# Patient Record
Sex: FEMALE | Race: WHITE | NOT HISPANIC OR LATINO | Employment: STUDENT | URBAN - METROPOLITAN AREA
[De-identification: names, ages, dates, MRNs, and addresses within clinical notes are randomized per-mention and may not be internally consistent; named-entity substitution may affect disease eponyms.]

---
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
## 2023-08-28 ENCOUNTER — OFFICE VISIT (OUTPATIENT)
Dept: URGENT CARE | Facility: MEDICAL CENTER | Age: 18
End: 2023-08-28
Payer: COMMERCIAL

## 2023-08-28 VITALS
SYSTOLIC BLOOD PRESSURE: 118 MMHG | TEMPERATURE: 98.5 F | DIASTOLIC BLOOD PRESSURE: 64 MMHG | RESPIRATION RATE: 18 BRPM | HEART RATE: 86 BPM | OXYGEN SATURATION: 100 %
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
## 2023-08-28 DIAGNOSIS — S29.012A REPETITIVE STRAIN INJURY OF THORACIC SPINE, INITIAL ENCOUNTER: Primary | ICD-10-CM

## 2023-08-28 PROCEDURE — G0382 LEV 3 HOSP TYPE B ED VISIT: HCPCS | Performed by: FAMILY MEDICINE

## 2023-08-28 RX ORDER — METHOCARBAMOL 500 MG/1
500 TABLET, FILM COATED ORAL
Qty: 20 TABLET | Refills: 0 | Status: SHIPPED | OUTPATIENT
Start: 2023-08-28 | End: 2023-09-07
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
## 2023-08-28 NOTE — PATIENT INSTRUCTIONS
I prescribed Robaxin 500 mg at bedtime as needed. Patient recommended to apply heating pad to affected area 15 to 20 minutes as often as needed. She was also educated on upper back exercises. However, I also gave her outpatient physical therapy referral.    Upper Back Exercises   AMBULATORY CARE:   Upper back exercises  help heal and strengthen your back muscles and prevent another injury. Ask your healthcare provider if you need to see a physical therapist for more advanced exercises. Seek care immediately if:   You have severe pain that prevents you from moving. Call your doctor if:   Your pain becomes worse. You have new pain. You have questions or concerns about your condition, care, or exercise program.    What you need to know about exercise safety:   Do the exercises on a mat or firm surface (not on a bed). A firm surface will support your spine and prevent upper back pain. Move slowly and smoothly. Avoid fast or jerky motions. Breathe normally. Do not hold your breath. Stop if you feel pain. It is normal to feel some discomfort at first, but you should not feel pain. Regular exercise will help decrease your discomfort over time. Perform upper back exercises safely:  Ask your healthcare provider which of the following exercises are best for you and how often to do them. Head rolls:  Sit in a chair or stand. Bring your chin toward your chest and roll your head to the right. Your ear should be over your shoulder. Hold this position for 5 seconds. Roll your head back toward your chest and to the left. Your ear should be over your left shoulder. Hold this position for 5 seconds. Next, roll your head back slowly in a clockwise [place] and repeat 3 times. Do 3 sets of head rolls. Scapular squeeze:  Sit or stand with your arms at your sides. Squeeze your shoulder blades together and hold for 3 seconds. Relax and repeat 3 times. Pectoralis stretch:   a doorway. Lift your hands and place them on each side of the door frame or wall slightly higher than your head. Lean forward slowly until you feel a gentle stretch. Hold for 15 seconds. Repeat 3 times, or as directed. Cat and camel exercise:  Place your hands and knees on the floor. Arch your back upward toward the ceiling and lower your head. Round out your spine as much as you can. Hold for 5 seconds. Lift your head upward and push your chest downward toward the floor. Hold for 5 seconds. Do 3 sets or as directed. Bird dog:  Place your hands and knees on the floor. Keep your wrists directly below your shoulders and your knees directly below your hips. Pull your belly button in toward your spine. Do not flatten or arch your back. Tighten your abdominal muscles. Raise one arm straight out so that it is aligned with your head. Next, raise the leg opposite your arm. Hold this position for 15 seconds. Lower your arm and leg slowly and change sides. Do 5 sets. Follow up with your doctor as directed:  Write down your questions so you remember to ask them during your visits. © Copyright [employer] 2022 Information is for End User's use only and may not be sold, redistributed or otherwise used for commercial purposes. The above information is an  only. It is not intended as medical advice for individual conditions or treatments. Talk to your doctor, nurse or pharmacist before following any medical regimen to see if it is safe and effective for you.

## 2023-09-17 ENCOUNTER — OFFICE VISIT (OUTPATIENT)
Dept: URGENT CARE | Facility: MEDICAL CENTER | Age: 18
End: 2023-09-17
Payer: COMMERCIAL

## 2023-09-17 VITALS
WEIGHT: 135 LBS | OXYGEN SATURATION: 97 % | RESPIRATION RATE: 20 BRPM | TEMPERATURE: 98 F | HEART RATE: 91 BPM | HEIGHT: 64 IN | BODY MASS INDEX: 23.05 KG/M2

## 2023-09-17 DIAGNOSIS — Z20.822 ENCOUNTER FOR LABORATORY TESTING FOR COVID-19 VIRUS: ICD-10-CM

## 2023-09-17 DIAGNOSIS — J40 BRONCHITIS: ICD-10-CM

## 2023-09-17 DIAGNOSIS — J01.00 ACUTE MAXILLARY SINUSITIS, RECURRENCE NOT SPECIFIED: Primary | ICD-10-CM

## 2023-09-17 LAB
SARS-COV-2 AG UPPER RESP QL IA: NEGATIVE
VALID CONTROL: NORMAL

## 2023-09-17 PROCEDURE — 87811 SARS-COV-2 COVID19 W/OPTIC: CPT | Performed by: PHYSICIAN ASSISTANT

## 2023-09-17 PROCEDURE — 99213 OFFICE O/P EST LOW 20 MIN: CPT | Performed by: PHYSICIAN ASSISTANT

## 2023-09-17 RX ORDER — PREGABALIN 25 MG/1
CAPSULE ORAL
COMMUNITY
Start: 2023-09-11

## 2023-09-17 RX ORDER — VENLAFAXINE HYDROCHLORIDE 150 MG/1
150 CAPSULE, EXTENDED RELEASE ORAL DAILY
COMMUNITY

## 2023-09-17 RX ORDER — ALBUTEROL SULFATE 90 UG/1
2 AEROSOL, METERED RESPIRATORY (INHALATION) EVERY 6 HOURS PRN
Qty: 8.5 G | Refills: 0 | Status: SHIPPED | OUTPATIENT
Start: 2023-09-17

## 2023-09-17 RX ORDER — AZITHROMYCIN 250 MG/1
TABLET, FILM COATED ORAL
Qty: 6 TABLET | Refills: 0 | Status: SHIPPED | OUTPATIENT
Start: 2023-09-17 | End: 2023-09-21

## 2023-09-17 RX ORDER — BENZONATATE 100 MG/1
100 CAPSULE ORAL 3 TIMES DAILY PRN
Qty: 20 CAPSULE | Refills: 0 | Status: SHIPPED | OUTPATIENT
Start: 2023-09-17

## 2023-09-17 RX ORDER — PREDNISONE 10 MG/1
TABLET ORAL
Qty: 20 TABLET | Refills: 0 | Status: SHIPPED | OUTPATIENT
Start: 2023-09-17

## 2023-09-17 NOTE — PROGRESS NOTES
[facility]        NAME: Yannick Sherman is a 21 y.o. female  : 2005    MRN: [id number]  DATE: 2023  TIME: 11:41 AM    Pulse 91   Temp 98 °F (36.7 °C)   Resp 20   Ht 5' 4" (1.626 m)   Wt 61.2 kg (135 lb)   LMP  (LMP Unknown) Comment: Depo  SpO2 97%   BMI 23.17 kg/m²     Assessment and Plan   Acute maxillary sinusitis, recurrence not specified [J01.00]  1. Acute maxillary sinusitis, recurrence not specified  Poct Covid 19 Rapid Antigen Test    azithromycin (ZITHROMAX) 250 mg tablet    albuterol (ProAir HFA) 90 mcg/act inhaler    benzonatate (TESSALON PERLES) 100 mg capsule    predniSONE 10 mg tablet      2. Bronchitis  azithromycin (ZITHROMAX) 250 mg tablet    albuterol (ProAir HFA) 90 mcg/act inhaler    benzonatate (TESSALON PERLES) 100 mg capsule    predniSONE 10 mg tablet      3. Encounter for laboratory testing for COVID-19 virus  azithromycin (ZITHROMAX) 250 mg tablet    albuterol (ProAir HFA) 90 mcg/act inhaler    benzonatate (TESSALON PERLES) 100 mg capsule    predniSONE 10 mg tablet            Patient Instructions       Follow up with PCP in 3-5 days. Proceed to  ER if symptoms worsen. Chief Complaint     Chief Complaint   Patient presents with   • Cough     Wednesday she woke with not feeling well. She had a negative covid test.  She has cough, stuffy nose, +PND, mucous is green. She states her heart rate is up and she gets winded easily         History of Present Illness       Pt with 4-5 days productive cough yellow nasal congestion and minor chest tightness       Review of Systems   Review of Systems   Constitutional: Negative. HENT: Negative. Eyes: Negative. Respiratory: Negative. Cardiovascular: Negative. Gastrointestinal: Negative. Endocrine: Negative. Genitourinary: Negative. Musculoskeletal: Negative. Skin: Negative. Allergic/Immunologic: Negative. Neurological: Negative. Hematological: Negative. Psychiatric/Behavioral: Negative. All other systems reviewed and are negative.         Current Medications       Current Outpatient Medications:   •  albuterol (ProAir HFA) 90 mcg/act inhaler, Inhale 2 puffs every 6 (six) hours as needed for wheezing, Disp: 8.5 g, Rfl: 0  •  albuterol (PROVENTIL HFA,VENTOLIN HFA) 90 mcg/act inhaler, Inhale 2 puffs, Disp: , Rfl:   •  azithromycin (ZITHROMAX) 250 mg tablet, Take 2 tablets today then 1 tablet daily x 4 days, Disp: 6 tablet, Rfl: 0  •  benzonatate (TESSALON PERLES) 100 mg capsule, Take 1 capsule (100 mg total) by mouth 3 (three) times a day as needed for cough, Disp: 20 capsule, Rfl: 0  •  cloNIDine (CATAPRES) 0.1 mg tablet, TAKE 1/2 TABLET IN THE MORNING AND ONE FULL TABLET AT NIGHT, Disp: , Rfl:   •  EPINEPHrine (EPIPEN) 0.3 mg/0.3 mL SOAJ, as directed, Disp: , Rfl:   •  fexofenadine (ALLEGRA) 180 MG tablet, Take 180 mg by mouth daily, Disp: , Rfl:   •  fluticasone (FLONASE) 50 mcg/act nasal spray, , Disp: , Rfl:   •  Mefenamic Acid 250 MG CAPS, Take 2 capsules by mouth once, then take 1 capsule by mouth every eight hours as needed for menstrual cramps, Disp: , Rfl:   •  melatonin 1 mg, Take by mouth, Disp: , Rfl:   •  predniSONE 10 mg tablet, 4 tabs po qd x 2 days then 3 tabs po qd x 2 days then 2 tabs po qd x 2 days then 1 tab po qd x 2 days, Disp: 20 tablet, Rfl: 0  •  senna (SENOKOT) 8.6 mg, Take 8.6 mg by mouth 2 (two) times a day, Disp: , Rfl:   •  tretinoin (RETIN-A) 0.01 % gel, , Disp: , Rfl:   •  venlafaxine (EFFEXOR-XR) 150 mg 24 hr capsule, Take 150 mg by mouth daily, Disp: , Rfl:   •  venlafaxine (EFFEXOR-XR) 75 mg 24 hr capsule, Take 75 mg by mouth daily, Disp: , Rfl:   •  buPROPion (WELLBUTRIN XL) 150 mg 24 hr tablet, , Disp: , Rfl:   •  drospirenone-ethinyl estradiol (DONNY) 3-0.03 MG per tablet, Take 1 tablet by mouth daily (Patient not taking: Reported on 9/17/2023), Disp: , Rfl:   •  lactase (LACTAID) 3,000 units tablet, Take 3,000 Units by mouth, Disp: , Rfl:   •  methocarbamol (ROBAXIN) 500 mg tablet, Take 1 tablet (500 mg total) by mouth daily at bedtime as needed for muscle spasms for up to 10 days, Disp: 20 tablet, Rfl: 0  •  norgestimate-ethinyl estradiol (ORTHO-CYCLEN) 0.25-35 MG-MCG per tablet, Take 1 tablet by mouth daily, Disp: , Rfl:   •  norgestrel-ethinyl estradiol (LO/OVRAL) 0.3 mg-30 mcg per tablet, Take 1 tablet by mouth daily (Patient not taking: Reported on 9/17/2023), Disp: , Rfl:   •  pantoprazole (PROTONIX) 40 mg tablet, , Disp: , Rfl:   •  polyethylene glycol (GLYCOLAX) 17 GM/SCOOP powder, , Disp: , Rfl:   •  pregabalin (LYRICA) 25 mg capsule, TAKE 1 CAPSULE BY MOUTH EVERY DAY NIGHTLY, Disp: , Rfl:   •  propranolol (INDERAL) 10 mg tablet, , Disp: , Rfl:   •  venlafaxine (EFFEXOR-XR) 37.5 mg 24 hr capsule, Take 37.5 mg by mouth daily, Disp: , Rfl:     Current Allergies     Allergies as of 09/17/2023 - Reviewed 09/17/2023   Allergen Reaction Noted   • Adapalene-benzoyl peroxide Other (See Comments) and Rash 08/12/2016            The following portions of the patient's history were reviewed and updated as appropriate: allergies, current medications, past family history, past medical history, past social history, past surgical history and problem list.     Past Medical History:   Diagnosis Date   • Constipation    • Depression    • IBS (irritable bowel syndrome)    • PCOS (polycystic ovarian syndrome)    • Seasonal allergies        History reviewed. No pertinent surgical history. History reviewed. No pertinent family history. Medications have been verified. Objective   Pulse 91   Temp 98 °F (36.7 °C)   Resp 20   Ht 5' 4" (1.626 m)   Wt 61.2 kg (135 lb)   LMP  (LMP Unknown) Comment: Depo  SpO2 97%   BMI 23.17 kg/m²        Physical Exam     Physical Exam  Vitals and nursing note reviewed. Constitutional:       Appearance: Normal appearance. She is normal weight. HENT:      Head: Normocephalic and atraumatic. Right Ear: Tympanic membrane, ear canal and external ear normal.      Left Ear: Tympanic membrane, ear canal and external ear normal.      Nose: Congestion and rhinorrhea present. Comments: Boggy mucosa  Yellow nasal d/c      Mouth/Throat:      Mouth: Mucous membranes are moist.      Pharynx: Oropharynx is clear. Eyes:      Extraocular Movements: Extraocular movements intact. Conjunctiva/sclera: Conjunctivae normal.      Pupils: Pupils are equal, round, and reactive to light. Cardiovascular:      Rate and Rhythm: Normal rate and regular rhythm. Pulses: Normal pulses. Heart sounds: Normal heart sounds. Pulmonary:      Effort: Pulmonary effort is normal.      Comments: Minor coarse sounds   Abdominal:      General: Abdomen is flat. Bowel sounds are normal.      Palpations: Abdomen is soft. Musculoskeletal:         General: Normal range of motion. Cervical back: Normal range of motion and neck supple. Skin:     General: Skin is warm. Capillary Refill: Capillary refill takes less than 2 seconds. Neurological:      General: No focal deficit present. Mental Status: She is alert and oriented to person, place, and time.    Psychiatric:         Mood and Affect: Mood normal.

## 2024-01-20 ENCOUNTER — OFFICE VISIT (OUTPATIENT)
Dept: URGENT CARE | Facility: MEDICAL CENTER | Age: 19
End: 2024-01-20
Payer: COMMERCIAL

## 2024-01-20 VITALS
DIASTOLIC BLOOD PRESSURE: 64 MMHG | SYSTOLIC BLOOD PRESSURE: 99 MMHG | HEART RATE: 94 BPM | RESPIRATION RATE: 18 BRPM | OXYGEN SATURATION: 99 % | TEMPERATURE: 98.3 F

## 2024-01-20 DIAGNOSIS — U07.1 COVID: ICD-10-CM

## 2024-01-20 DIAGNOSIS — J02.9 PHARYNGITIS, UNSPECIFIED ETIOLOGY: Primary | ICD-10-CM

## 2024-01-20 LAB — S PYO AG THROAT QL: NEGATIVE

## 2024-01-20 PROCEDURE — 99213 OFFICE O/P EST LOW 20 MIN: CPT | Performed by: PHYSICIAN ASSISTANT

## 2024-01-20 PROCEDURE — 87880 STREP A ASSAY W/OPTIC: CPT | Performed by: PHYSICIAN ASSISTANT

## 2024-01-20 RX ORDER — METRONIDAZOLE 10 MG/G
1 GEL TOPICAL DAILY
COMMUNITY
Start: 2023-06-12 | End: 2024-06-11

## 2024-01-20 RX ORDER — KETOROLAC TROMETHAMINE 10 MG/1
10 TABLET, FILM COATED ORAL 2 TIMES DAILY
COMMUNITY
Start: 2023-10-10

## 2024-01-20 RX ORDER — PREDNISONE 20 MG/1
TABLET ORAL
Qty: 10 TABLET | Refills: 0 | Status: SHIPPED | OUTPATIENT
Start: 2024-01-20

## 2024-01-20 RX ORDER — MEDROXYPROGESTERONE ACETATE 150 MG/ML
150 INJECTION, SUSPENSION INTRAMUSCULAR
COMMUNITY

## 2024-01-20 RX ORDER — PIMECROLIMUS 10 MG/G
1 CREAM TOPICAL 2 TIMES DAILY
COMMUNITY
Start: 2023-10-13

## 2024-01-20 RX ORDER — NIRMATRELVIR AND RITONAVIR 300-100 MG
KIT ORAL
COMMUNITY
Start: 2024-01-18

## 2024-01-20 RX ORDER — TACROLIMUS 1 MG/G
OINTMENT TOPICAL 2 TIMES DAILY
COMMUNITY

## 2024-01-20 RX ORDER — CYCLOBENZAPRINE HCL 10 MG
10 TABLET ORAL DAILY PRN
COMMUNITY

## 2024-01-20 RX ORDER — DESONIDE 0.5 MG/G
1 OINTMENT TOPICAL 2 TIMES DAILY
COMMUNITY
Start: 2023-08-30 | End: 2024-08-29

## 2024-01-20 NOTE — PROGRESS NOTES
[facility]    NAME: Gita Garcia is a 18 y.o. female  : 2005    MRN: [id number]  DATE: 2024  TIME: 11:30 AM    Assessment and Plan   Pharyngitis, unspecified etiology [J02.9]  1. Pharyngitis, unspecified etiology  POCT rapid strepA    predniSONE 20 mg tablet      2. COVID            Patient Instructions     Patient Instructions   Take prednisone as instructed.  While on prednisone do not take any ibuprofen or ibuprofen like products.  May safely take Tylenol if needed.     Warm salt water gargles, throat lozenges, Chloraseptic spray, Tylenol and/or ibuprofen (if not contraindicated) per bottle instructions for comfort as needed.  Warm tea with honey may also be soothing.    Follow-up with primary care if not resolving over the next 7 to 10 days.  May need more evaluation then can be done in the care in our office.    If you would develop severe worsening of throat pain, hot potato voice, inability to swallow saliva to the point of drooling due to throat swelling please proceed immediately to emergency room for further evaluation.       Chief Complaint     Chief Complaint   Patient presents with    Sore Throat     Patient reports that she Covid positive. She reports her sore throat has become worst and noted white spots.        History of Present Illness   Gita Garcia presents to the clinic c/o  18-year-old female comes in with terrible sore throat after starting with COVID a few days ago.  Just had first full day of Paxlovid yesterday.    Has had fever, chills, cough, congestion and worsening sore throat.  Has been doing ibuprofen 400 mg every 4-6 hours and Tylenol in between.  She says she feels worse with the Tylenol in between.  She is concerned that maybe she also has strep.  No known exposures to strep.    Sore Throat   Associated symptoms include congestion, coughing and trouble swallowing. Pertinent negatives include no ear discharge, ear pain or shortness of breath.        Review of Systems   Review of Systems   Constitutional:  Positive for activity change, appetite change, chills, diaphoresis, fatigue and fever.   HENT:  Positive for congestion, postnasal drip, rhinorrhea, sinus pressure, sore throat and trouble swallowing. Negative for ear discharge, ear pain and sinus pain.    Eyes: Negative.    Respiratory:  Positive for cough. Negative for chest tightness, shortness of breath and wheezing.    Cardiovascular: Negative.    Hematological: Negative.        Current Medications     Long-Term Medications   Medication Sig Dispense Refill    desonide (DESOWEN) 0.05 % ointment Apply 1 application. topically 2 (two) times a day      ketorolac (TORADOL) 10 mg tablet Take 10 mg by mouth 2 (two) times a day      metroNIDAZOLE (METROGEL) 1 % gel Apply 1 application. topically daily      pimecrolimus (ELIDEL) 1 % cream Apply 1 Application topically 2 (two) times a day To affected area      buPROPion (WELLBUTRIN XL) 150 mg 24 hr tablet  (Patient not taking: No sig reported)      Cholecalciferol 50 MCG (2000 UT) CAPS Take by mouth daily      cloNIDine (CATAPRES) 0.1 mg tablet TAKE 1/2 TABLET IN THE MORNING AND ONE FULL TABLET AT NIGHT      cyclobenzaprine (FLEXERIL) 10 mg tablet Take 10 mg by mouth daily as needed      drospirenone-ethinyl estradiol (DONNY) 3-0.03 MG per tablet Take 1 tablet by mouth daily (Patient not taking: Reported on 9/17/2023)      EPINEPHrine (EPIPEN) 0.3 mg/0.3 mL SOAJ as directed      fexofenadine (ALLEGRA) 180 MG tablet Take 180 mg by mouth daily      fluticasone (FLONASE) 50 mcg/act nasal spray       lactase (LACTAID) 3,000 units tablet Take 3,000 Units by mouth      linaCLOtide 145 MCG CAPS Take by mouth      medroxyPROGESTERone (DEPO-PROVERA) 150 mg/mL injection Inject 150 mg into a muscle every 3 (three) months      Mefenamic Acid 250 MG CAPS Take 2 capsules by mouth once, then take 1 capsule by mouth every eight hours as needed for menstrual cramps       methocarbamol (ROBAXIN) 500 mg tablet Take 1 tablet (500 mg total) by mouth daily at bedtime as needed for muscle spasms for up to 10 days 20 tablet 0    norgestimate-ethinyl estradiol (ORTHO-CYCLEN) 0.25-35 MG-MCG per tablet Take 1 tablet by mouth daily      norgestrel-ethinyl estradiol (LO/OVRAL) 0.3 mg-30 mcg per tablet Take 1 tablet by mouth daily (Patient not taking: Reported on 9/17/2023)      pantoprazole (PROTONIX) 40 mg tablet  (Patient not taking: Reported on 9/17/2023)      polyethylene glycol (GLYCOLAX) 17 GM/SCOOP powder  (Patient not taking: Reported on 9/17/2023)      pregabalin (LYRICA) 25 mg capsule TAKE 1 CAPSULE BY MOUTH EVERY DAY NIGHTLY      propranolol (INDERAL) 10 mg tablet  (Patient not taking: Reported on 9/17/2023)      senna (SENOKOT) 8.6 mg Take 8.6 mg by mouth 2 (two) times a day      tacrolimus (PROTOPIC) 0.1 % ointment Apply topically 2 (two) times a day To affected area      tretinoin (RETIN-A) 0.01 % gel       venlafaxine (EFFEXOR-XR) 150 mg 24 hr capsule Take 150 mg by mouth daily      venlafaxine (EFFEXOR-XR) 37.5 mg 24 hr capsule Take 37.5 mg by mouth daily      venlafaxine (EFFEXOR-XR) 75 mg 24 hr capsule Take 75 mg by mouth daily         Current Allergies     Allergies as of 01/20/2024 - Reviewed 01/20/2024   Allergen Reaction Noted    Egg phospholipids - food allergy Anaphylaxis, Hives, and Swelling 01/20/2024    Peanut (diagnostic) - food allergy Anaphylaxis, Hives, and Swelling 01/20/2024    Peanut oil - food allergy Anaphylaxis, Other (See Comments), and Rash 03/11/2015    Gluten meal - food allergy Diarrhea, GI Intolerance, Other (See Comments), and Vomiting 05/21/2019    Milk-related compounds - food allergy Diarrhea 05/21/2019    Wheat bran - food allergy Diarrhea and Other (See Comments) 07/02/2019    Adapalene-benzoyl peroxide Other (See Comments) and Rash 08/12/2016    Banana extract allergy skin test - food allergy GI Intolerance 08/09/2021          The following  portions of the patient's history were reviewed and updated as appropriate: allergies, current medications, past family history, past medical history, past social history, past surgical history and problem list.  Past Medical History:   Diagnosis Date    Constipation     Depression     IBS (irritable bowel syndrome)     PCOS (polycystic ovarian syndrome)     Seasonal allergies      History reviewed. No pertinent surgical history.  History reviewed. No pertinent family history.    Objective   BP 99/64   Pulse 94   Temp 98.3 °F (36.8 °C)   Resp 18   SpO2 99%   No LMP recorded.       Physical Exam     Physical Exam  Vitals and nursing note reviewed.   Constitutional:       General: She is not in acute distress.     Appearance: She is well-developed. She is ill-appearing. She is not toxic-appearing or diaphoretic.      Comments: Appears mildly ill but in no acute distress.  No trismus or conversational dyspnea.   HENT:      Head: Normocephalic and atraumatic.      Right Ear: Tympanic membrane, ear canal and external ear normal. No drainage, swelling or tenderness. No middle ear effusion. Tympanic membrane is not erythematous.      Left Ear: Tympanic membrane, ear canal and external ear normal. No drainage, swelling or tenderness.  No middle ear effusion. Tympanic membrane is not erythematous.      Nose: Congestion present. No rhinorrhea.      Mouth/Throat:      Mouth: Mucous membranes are moist. No oral lesions.      Pharynx: Uvula midline. Pharyngeal swelling and posterior oropharyngeal erythema present. No oropharyngeal exudate or uvula swelling.      Tonsils: No tonsillar exudate or tonsillar abscesses.      Comments: Generalized tonsillar pharyngeal redness without exudate or obvious lesions.  Patent airway.  No trismus.  Eyes:      General:         Right eye: No discharge.         Left eye: No discharge.      Conjunctiva/sclera: Conjunctivae normal.      Pupils: Pupils are equal, round, and reactive to light.    Cardiovascular:      Rate and Rhythm: Normal rate and regular rhythm.      Heart sounds: Normal heart sounds. No murmur heard.     No friction rub. No gallop.   Pulmonary:      Effort: Pulmonary effort is normal. No respiratory distress.      Breath sounds: Normal breath sounds. No stridor. No wheezing, rhonchi or rales.   Musculoskeletal:      Cervical back: Normal range of motion and neck supple. No rigidity or tenderness.   Lymphadenopathy:      Cervical: No cervical adenopathy.   Skin:     General: Skin is warm and dry.      Coloration: Skin is not jaundiced or pale.      Findings: No rash.   Neurological:      Mental Status: She is alert and oriented to person, place, and time.   Psychiatric:         Mood and Affect: Mood normal.         Behavior: Behavior normal.

## 2024-01-20 NOTE — PATIENT INSTRUCTIONS
Take prednisone as instructed.  While on prednisone do not take any ibuprofen or ibuprofen like products.  May safely take Tylenol if needed.     Warm salt water gargles, throat lozenges, Chloraseptic spray, Tylenol and/or ibuprofen (if not contraindicated) per bottle instructions for comfort as needed.  Warm tea with honey may also be soothing.    Follow-up with primary care if not resolving over the next 7 to 10 days.  May need more evaluation then can be done in the care in our office.    If you would develop severe worsening of throat pain, hot potato voice, inability to swallow saliva to the point of drooling due to throat swelling please proceed immediately to emergency room for further evaluation.

## 2024-02-25 ENCOUNTER — OFFICE VISIT (OUTPATIENT)
Dept: URGENT CARE | Facility: MEDICAL CENTER | Age: 19
End: 2024-02-25
Payer: COMMERCIAL

## 2024-02-25 VITALS
DIASTOLIC BLOOD PRESSURE: 71 MMHG | RESPIRATION RATE: 18 BRPM | OXYGEN SATURATION: 100 % | HEART RATE: 120 BPM | SYSTOLIC BLOOD PRESSURE: 127 MMHG | TEMPERATURE: 99.4 F

## 2024-02-25 DIAGNOSIS — J02.9 SORE THROAT: ICD-10-CM

## 2024-02-25 DIAGNOSIS — J02.9 ACUTE PHARYNGITIS, UNSPECIFIED ETIOLOGY: Primary | ICD-10-CM

## 2024-02-25 LAB — S PYO AG THROAT QL: NEGATIVE

## 2024-02-25 PROCEDURE — 87070 CULTURE OTHR SPECIMN AEROBIC: CPT | Performed by: FAMILY MEDICINE

## 2024-02-25 PROCEDURE — 99213 OFFICE O/P EST LOW 20 MIN: CPT | Performed by: FAMILY MEDICINE

## 2024-02-25 PROCEDURE — 87880 STREP A ASSAY W/OPTIC: CPT | Performed by: FAMILY MEDICINE

## 2024-02-25 NOTE — PATIENT INSTRUCTIONS
Cold Symptoms   AMBULATORY CARE:   Cold symptoms  include sneezing, dry throat, a stuffy nose, headache, watery eyes, and a cough. Your cough may be dry, or you may cough up mucus. You may also have muscle aches, joint pain, and tiredness. Rarely, you may have a fever. Cold symptoms occur from inflammation in your upper respiratory system caused by a virus. Most colds go away without treatment.  Seek care immediately if:   You have increased tiredness and weakness.    You are unable to eat.     Your heart is beating much faster than usual for you.     You see white spots in the back of your throat and your neck is swollen and sore to the touch.     You see pinpoint or larger reddish-purple dots on your skin.    Contact your healthcare provider if:   You have a fever higher than 102°F (38.9°C).    You have new or worsening shortness of breath.     You have thick nasal drainage for more than 2 days.     Your symptoms do not improve or get worse within 5 days.     You have questions or concerns about your condition or care.    Treatment for cold symptoms  may include NSAIDS to decrease muscle aches and fever. Cold medicines may also be given to decrease coughing, nasal stuffiness, sneezing, and a runny nose.   Manage your cold symptoms:  The following may help relieve cold symptoms, such as a dry throat and congestion:  Gargle with mouthwash or warm salt water as directed.     Suck on throat lozenges or hard candy.     Use a cold or warm vaporizer or humidifier to ease your breathing.     Rest for at least 2 days and then as needed to decrease tiredness and weakness.     Use petroleum based jelly around your nostrils to decrease irritation from blowing your nose.     Drink plenty of liquids. Liquids will help thin and loosen thick mucus so you can cough it up. Liquids will also keep you hydrated. Ask your healthcare provider which liquids are best for you and how much to drink each day.    Prevent the spread of germs   by washing your hands often. You can spread your cold germs to others for at least 3 days after your symptoms start. Do not share items, such as eating utensils. Cover your nose and mouth when you cough or sneeze using the crook of your elbow instead of your hands. Throw used tissues in the garbage.  Do not smoke:  Smoking may worsen your symptoms and increase the length of time you feel sick. Talk with your healthcare provider if you need help to stop smoking.  Follow up with your doctor as directed:  Write down your questions so you remember to ask them during your visits.  © Copyright [employer] 2023 Information is for End User's use only and may not be sold, redistributed or otherwise used for commercial purposes.  The above information is an  only. It is not intended as medical advice for individual conditions or treatments. Talk to your doctor, nurse or pharmacist before following any medical regimen to see if it is safe and effective for you.

## 2024-02-25 NOTE — PROGRESS NOTES
[facility]        NAME: Gita Garcia is a 18 y.o. female  : 2005    MRN: [id number]  DATE: 2024  TIME: 5:11 PM    Assessment and Plan   Acute pharyngitis, unspecified etiology [J02.9]  1. Acute pharyngitis, unspecified etiology  Throat culture      2. Sore throat  POCT rapid ANTIGEN strepA        POCT Strep: negative  Throat culture pending  Will treat conservatively for now    Patient Instructions       Follow up with PCP in 3-5 days.  Proceed to  ER if symptoms worsen.    Chief Complaint     Chief Complaint   Patient presents with   • Sore Throat     Patient c/o sore throat and fever x 2 days          History of Present Illness       Sore Throat   This is a new problem. The current episode started yesterday. The problem has been gradually worsening. Neither side of throat is experiencing more pain than the other. There has been no fever. The pain is at a severity of 8/10. Associated symptoms include coughing. Pertinent negatives include no abdominal pain, congestion, diarrhea, drooling, ear discharge, ear pain, headaches, hoarse voice, plugged ear sensation, neck pain, shortness of breath, stridor, swollen glands, trouble swallowing or vomiting. She has had no exposure to strep or mono.     Gita Garcia is a 18 y.o. female  who presented to [facility] today  with a h/o sore throat that started yesterday/last night.  She feels like her throat is swollen, and painful to swallow.  She has fever, and took some Ibuprofen earlier, but is wearing off and can feel like she has a fever again.    + chills, body aches  No nausea, vomiting, diarrhea      Answers submitted by the patient for this visit:  Sore Throat Questionnaire (Submitted on 2024)  Chief Complaint: Sore throat  Chronicity: new  Onset: yesterday  Progression since onset: gradually worsening  Pain worse on: neither  Fever: no fever  Pain - numeric: 8/10      Review of Systems   Review of Systems   HENT:   Positive for sore throat. Negative for congestion, drooling, ear discharge, ear pain, hoarse voice and trouble swallowing.    Respiratory:  Positive for cough. Negative for shortness of breath and stridor.    Gastrointestinal:  Negative for abdominal pain, diarrhea and vomiting.   Musculoskeletal:  Negative for neck pain.   Neurological:  Negative for headaches.         Current Medications       Current Outpatient Medications:   •  albuterol (ProAir HFA) 90 mcg/act inhaler, Inhale 2 puffs every 6 (six) hours as needed for wheezing, Disp: 8.5 g, Rfl: 0  •  albuterol (PROVENTIL HFA,VENTOLIN HFA) 90 mcg/act inhaler, Inhale 2 puffs, Disp: , Rfl:   •  benzonatate (TESSALON PERLES) 100 mg capsule, Take 1 capsule (100 mg total) by mouth 3 (three) times a day as needed for cough, Disp: 20 capsule, Rfl: 0  •  buPROPion (WELLBUTRIN XL) 150 mg 24 hr tablet, , Disp: , Rfl:   •  Cholecalciferol 50 MCG (2000 UT) CAPS, Take by mouth daily, Disp: , Rfl:   •  cloNIDine (CATAPRES) 0.1 mg tablet, TAKE 1/2 TABLET IN THE MORNING AND ONE FULL TABLET AT NIGHT, Disp: , Rfl:   •  cyclobenzaprine (FLEXERIL) 10 mg tablet, Take 10 mg by mouth daily as needed, Disp: , Rfl:   •  desonide (DESOWEN) 0.05 % ointment, Apply 1 application. topically 2 (two) times a day, Disp: , Rfl:   •  drospirenone-ethinyl estradiol (DONNY) 3-0.03 MG per tablet, Take 1 tablet by mouth daily (Patient not taking: Reported on 9/17/2023), Disp: , Rfl:   •  EPINEPHrine (EPIPEN) 0.3 mg/0.3 mL SOAJ, as directed, Disp: , Rfl:   •  fexofenadine (ALLEGRA) 180 MG tablet, Take 180 mg by mouth daily, Disp: , Rfl:   •  fluticasone (FLONASE) 50 mcg/act nasal spray, , Disp: , Rfl:   •  ketorolac (TORADOL) 10 mg tablet, Take 10 mg by mouth 2 (two) times a day, Disp: , Rfl:   •  lactase (LACTAID) 3,000 units tablet, Take 3,000 Units by mouth, Disp: , Rfl:   •  linaCLOtide 145 MCG CAPS, Take by mouth, Disp: , Rfl:   •  medroxyPROGESTERone (DEPO-PROVERA) 150 mg/mL injection, Inject  150 mg into a muscle every 3 (three) months, Disp: , Rfl:   •  Mefenamic Acid 250 MG CAPS, Take 2 capsules by mouth once, then take 1 capsule by mouth every eight hours as needed for menstrual cramps, Disp: , Rfl:   •  melatonin 1 mg, Take by mouth, Disp: , Rfl:   •  methocarbamol (ROBAXIN) 500 mg tablet, Take 1 tablet (500 mg total) by mouth daily at bedtime as needed for muscle spasms for up to 10 days, Disp: 20 tablet, Rfl: 0  •  metroNIDAZOLE (METROGEL) 1 % gel, Apply 1 application. topically daily, Disp: , Rfl:   •  MONTELUKAST SODIUM PO, Take 5 mg by mouth daily, Disp: , Rfl:   •  norgestimate-ethinyl estradiol (ORTHO-CYCLEN) 0.25-35 MG-MCG per tablet, Take 1 tablet by mouth daily, Disp: , Rfl:   •  norgestrel-ethinyl estradiol (LO/OVRAL) 0.3 mg-30 mcg per tablet, Take 1 tablet by mouth daily (Patient not taking: Reported on 9/17/2023), Disp: , Rfl:   •  pantoprazole (PROTONIX) 40 mg tablet, , Disp: , Rfl:   •  Paxlovid, 300/100, tablet therapy pack, , Disp: , Rfl:   •  pimecrolimus (ELIDEL) 1 % cream, Apply 1 Application topically 2 (two) times a day To affected area, Disp: , Rfl:   •  polyethylene glycol (GLYCOLAX) 17 GM/SCOOP powder, , Disp: , Rfl:   •  predniSONE 10 mg tablet, 4 tabs po qd x 2 days then 3 tabs po qd x 2 days then 2 tabs po qd x 2 days then 1 tab po qd x 2 days, Disp: 20 tablet, Rfl: 0  •  predniSONE 20 mg tablet, One po bid x 5 days.  Take with food, Disp: 10 tablet, Rfl: 0  •  pregabalin (LYRICA) 25 mg capsule, TAKE 1 CAPSULE BY MOUTH EVERY DAY NIGHTLY, Disp: , Rfl:   •  propranolol (INDERAL) 10 mg tablet, , Disp: , Rfl:   •  senna (SENOKOT) 8.6 mg, Take 8.6 mg by mouth 2 (two) times a day, Disp: , Rfl:   •  tacrolimus (PROTOPIC) 0.1 % ointment, Apply topically 2 (two) times a day To affected area, Disp: , Rfl:   •  tretinoin (RETIN-A) 0.01 % gel, , Disp: , Rfl:   •  venlafaxine (EFFEXOR-XR) 150 mg 24 hr capsule, Take 150 mg by mouth daily, Disp: , Rfl:   •  venlafaxine (EFFEXOR-XR) 37.5  mg 24 hr capsule, Take 37.5 mg by mouth daily, Disp: , Rfl:   •  venlafaxine (EFFEXOR-XR) 75 mg 24 hr capsule, Take 75 mg by mouth daily, Disp: , Rfl:     Current Allergies     Allergies as of 02/25/2024 - Reviewed 01/20/2024   Allergen Reaction Noted   • Albumen, egg - food allergy Anaphylaxis 10/25/2019   • Egg phospholipids - food allergy Anaphylaxis, Hives, and Swelling 01/20/2024   • Peanut (diagnostic) - food allergy Anaphylaxis, Hives, and Swelling 01/20/2024   • Peanut oil - food allergy Anaphylaxis, Other (See Comments), and Rash 03/11/2015   • Gluten meal - food allergy Diarrhea, GI Intolerance, Other (See Comments), and Vomiting 05/21/2019   • Milk-related compounds - food allergy Diarrhea 05/21/2019   • Wheat bran - food allergy Diarrhea and Other (See Comments) 07/02/2019   • Adapalene-benzoyl peroxide Other (See Comments) and Rash 08/12/2016   • Banana extract allergy skin test - food allergy GI Intolerance 08/09/2021            The following portions of the patient's history were reviewed and updated as appropriate: allergies, current medications, past family history, past medical history, past social history, past surgical history and problem list.     Past Medical History:   Diagnosis Date   • Constipation    • Depression    • IBS (irritable bowel syndrome)    • PCOS (polycystic ovarian syndrome)    • Seasonal allergies        No past surgical history on file.    No family history on file.      Medications have been verified.        Objective   /71   Pulse (!) 120   Temp 99.4 °F (37.4 °C)   Resp 18   SpO2 100%   No LMP recorded.       Physical Exam     Physical Exam  Vitals and nursing note reviewed.   Constitutional:       Appearance: She is well-developed.   HENT:      Head: Normocephalic and atraumatic.      Right Ear: Tympanic membrane and ear canal normal.      Left Ear: Tympanic membrane and ear canal normal.      Nose: No congestion or rhinorrhea.      Mouth/Throat:      Pharynx:  Pharyngeal swelling and posterior oropharyngeal erythema present.      Tonsils: 0 on the right. 0 on the left.   Eyes:      Conjunctiva/sclera: Conjunctivae normal.   Cardiovascular:      Rate and Rhythm: Regular rhythm. Tachycardia present.   Pulmonary:      Effort: Pulmonary effort is normal. No respiratory distress.      Breath sounds: Normal breath sounds.   Lymphadenopathy:      Cervical: Cervical adenopathy present.   Neurological:      Mental Status: She is alert and oriented to person, place, and time.   Psychiatric:         Mood and Affect: Mood normal.         Behavior: Behavior normal.                   abdominal pain: No  congestion: No  cough: Yes  diarrhea: No  drooling: No  ear discharge: No  ear pain: No  headaches: No  hoarse voice: No  neck pain: No  plugged ear sensation: No  shortness of breath: No  stridor: No  swollen glands: No  trouble swallowing: No  vomiting: No  strep: No  mono: No

## 2024-02-27 LAB — BACTERIA THROAT CULT: NORMAL
